# Patient Record
(demographics unavailable — no encounter records)

---

## 2024-12-18 NOTE — HISTORY OF PRESENT ILLNESS
[de-identified] : The patient has had an endoscopy in the past. [FreeTextEntry1] : The patient has had a colonoscopy done by another physician less than 5 years ago

## 2024-12-18 NOTE — ASSESSMENT
[FreeTextEntry1] : The patient is a 62-year-old male who has a history of type 2 diabetes.  The patient had a colonoscopy done by another physician with no significant findings.  The patient admits to mild constipation in the form of a hard stool with occasional skipping days.  This may be on the basis of the patient's diabetes medication or bowel hypomotility.  There are no red flag signs such as blood in the stool, abdominal pain or unexplained weight loss.  The patient was told to use MiraLAX several days a week.  The patient's reflux is under excellent control, and he will attempt to use the omeprazole on alternate days in the hopes of transitioning him over to as needed or an H2 blocker.  Mr. Simpson will get back to me with a progress report.  He will also provide me with a copy of his previous colonoscopy.

## 2024-12-18 NOTE — REVIEW OF SYSTEMS
[As Noted in HPI] : as noted in HPI [Constipation] : constipation [Fever] : no fever [Chills] : no chills [Feeling Tired] : not feeling tired [Recent Weight Loss (___ Lbs)] : no recent weight loss [Chest Pain] : no chest pain [Palpitations] : no palpitations [SOB on Exertion] : no shortness of breath during exertion [Abdominal Pain] : no abdominal pain [Diarrhea] : no diarrhea [Heartburn] : no heartburn [Bleeding] : no bleeding [Bloating (gassiness)] : no bloating

## 2024-12-18 NOTE — HISTORY OF PRESENT ILLNESS
[de-identified] : The patient has had an endoscopy in the past. [FreeTextEntry1] : The patient has had a colonoscopy done by another physician less than 5 years ago